# Patient Record
Sex: FEMALE | Race: BLACK OR AFRICAN AMERICAN | NOT HISPANIC OR LATINO | URBAN - METROPOLITAN AREA
[De-identification: names, ages, dates, MRNs, and addresses within clinical notes are randomized per-mention and may not be internally consistent; named-entity substitution may affect disease eponyms.]

---

## 2019-07-05 ENCOUNTER — OFFICE VISIT (OUTPATIENT)
Dept: URGENT CARE | Facility: CLINIC | Age: 39
End: 2019-07-05
Payer: COMMERCIAL

## 2019-07-05 VITALS
OXYGEN SATURATION: 99 % | BODY MASS INDEX: 23.92 KG/M2 | TEMPERATURE: 99 F | WEIGHT: 135 LBS | DIASTOLIC BLOOD PRESSURE: 92 MMHG | SYSTOLIC BLOOD PRESSURE: 132 MMHG | HEIGHT: 63 IN | RESPIRATION RATE: 16 BRPM | HEART RATE: 84 BPM

## 2019-07-05 DIAGNOSIS — T23.102A: Primary | ICD-10-CM

## 2019-07-05 PROCEDURE — 3008F PR BODY MASS INDEX (BMI) DOCUMENTED: ICD-10-PCS | Mod: CPTII,S$GLB,, | Performed by: EMERGENCY MEDICINE

## 2019-07-05 PROCEDURE — 99203 OFFICE O/P NEW LOW 30 MIN: CPT | Mod: S$GLB,,, | Performed by: EMERGENCY MEDICINE

## 2019-07-05 PROCEDURE — 99203 PR OFFICE/OUTPT VISIT, NEW, LEVL III, 30-44 MIN: ICD-10-PCS | Mod: S$GLB,,, | Performed by: EMERGENCY MEDICINE

## 2019-07-05 PROCEDURE — 3008F BODY MASS INDEX DOCD: CPT | Mod: CPTII,S$GLB,, | Performed by: EMERGENCY MEDICINE

## 2019-07-05 RX ORDER — ACETAMINOPHEN 500 MG
1000 TABLET ORAL
Status: COMPLETED | OUTPATIENT
Start: 2019-07-05 | End: 2019-07-05

## 2019-07-05 RX ORDER — HYDROCODONE BITARTRATE AND ACETAMINOPHEN 10; 325 MG/1; MG/1
1 TABLET ORAL EVERY 8 HOURS PRN
Qty: 3 TABLET | Refills: 0 | Status: SHIPPED | OUTPATIENT
Start: 2019-07-05 | End: 2019-07-06

## 2019-07-05 RX ORDER — ACETAMINOPHEN 500 MG
500 TABLET ORAL
Status: DISCONTINUED | OUTPATIENT
Start: 2019-07-05 | End: 2019-07-05

## 2019-07-05 RX ADMIN — Medication 1000 MG: at 03:07
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
## 2019-07-05 NOTE — PATIENT INSTRUCTIONS
Thomas Benavidez MD  Go to the Emergency Department for any problems  Call your PCP for follow up next available.    First-Degree Burn  A burn occurs when skin is exposed to too much heat, sun, or harsh chemicals. A first-degree burn (superficial burn) causes mainly redness. It heals in a few days.  Home care  Follow these guidelines when caring for yourself at home:  · Use pain medicine as directed. You may use over-the-counter medicine to control pain if no pain medicine was prescribed. If you have chronic liver or kidney disease, talk with your healthcare provider before using acetaminophen or ibuprofen. Also talk with your provider if you've had a stomach ulcer or GI bleeding.  · On the first day, you may put a cool compress on the burn to relieve pain. You can use a small towel soaked in cool water as a cool compress.  · Numbing medicines for sunburn can be used for temporary relief of the burning feeling. These medicines include lidocaine or benzocaine. You dont need a prescription for them.  · Moisturizers with aloe vera can help soothe the burn.  · Don't pick or scratch at the affected areas. Use over-the-counter medicines like diphenhydramine for itching. This medicine is taken by mouth.  · Since you don't have an open wound, you don't need to use antibiotic cream or ointment. Sometimes an infection may occur even with proper treatment. Be sure to check the burn daily for the signs of infection listed below.  · Wear a hat, sunscreen, and long sleeves while in the sun.  · Wear loose-fitting clothing until the burn heals.  Follow-up care  Follow up with your healthcare provider, or as advised. Most first-degree burns heal well without complications.  When to seek medical advice  Call your healthcare provider right away if any of these signs of infection occur:  · Fever of 100.4°F (38°C) or higher, or as directed by your healthcare provider  · Pain gets worse  · Redness or swelling gets worse  · Pus comes from  the burn  · Red streaks in your skin come from the burn  · Wound doesn't appear to be healing  Date Last Reviewed: 12/1/2016  © 0786-4430 [employer] [address] [place] [address], [place] [address]. All rights reserved. This information is not intended as a substitute for professional medical care. Always follow your healthcare professional's instructions.

## 2019-07-08 ENCOUNTER — TELEPHONE (OUTPATIENT)
Dept: URGENT CARE | Facility: CLINIC | Age: 39
End: 2019-07-08